# Patient Record
Sex: MALE | Race: BLACK OR AFRICAN AMERICAN
[De-identification: names, ages, dates, MRNs, and addresses within clinical notes are randomized per-mention and may not be internally consistent; named-entity substitution may affect disease eponyms.]

---

## 2019-10-18 ENCOUNTER — HOSPITAL ENCOUNTER (OUTPATIENT)
Dept: HOSPITAL 61 - PF | Age: 84
Discharge: HOME | End: 2019-10-18
Attending: FAMILY MEDICINE
Payer: MEDICARE

## 2019-10-18 DIAGNOSIS — R06.2: Primary | ICD-10-CM

## 2019-10-18 PROCEDURE — 94010 BREATHING CAPACITY TEST: CPT

## 2019-10-18 PROCEDURE — 94729 DIFFUSING CAPACITY: CPT

## 2019-10-23 NOTE — RESP
DATE OF SERVICE:  10/18/2019



PULMONARY FUNCTION TEST



The patient underwent full pulmonary function testing on 10/18/2019.  The FEV1

to FVC ratio was 75%, FEV1 was normal at 93% of predicted 1.74 liters, FVC was

2.32 liters at 94% of predicted.  Total lung capacity was elevated.  Residual

volume was elevated.  Diffusion capacity was preserved.



IMPRESSION:

1.  Mild air flow limitation.

2.  Residual volume elevated compatible with air trapping.

 



______________________________

PAIGE SALMERON MD



DR:  [email or web address]  JOB#:  [id number] / [id number]

DD:  10/23/2019 13:12  DT:  10/23/2019 13:24